# Patient Record
Sex: FEMALE | Race: WHITE | NOT HISPANIC OR LATINO | URBAN - METROPOLITAN AREA
[De-identification: names, ages, dates, MRNs, and addresses within clinical notes are randomized per-mention and may not be internally consistent; named-entity substitution may affect disease eponyms.]

---

## 2021-12-14 ENCOUNTER — PREPPED CHART (OUTPATIENT)
Dept: URBAN - METROPOLITAN AREA CLINIC 19 | Facility: CLINIC | Age: 61
End: 2021-12-14

## 2022-04-13 ASSESSMENT — KERATOMETRY
OS_K2POWER_DIOPTERS: 42.75
OD_K1POWER_DIOPTERS: 42.75
OD_AXISANGLE2_DEGREES: 163
OD_K2POWER_DIOPTERS: 43.50
OS_AXISANGLE2_DEGREES: 44
OD_AXISANGLE_DEGREES: 73
OS_AXISANGLE_DEGREES: 134
OS_K1POWER_DIOPTERS: 41.75

## 2022-04-13 ASSESSMENT — VISUAL ACUITY
OS_CC: 20/25
OD_CC: 20/25
OU_CC: J1+

## 2022-04-13 ASSESSMENT — TONOMETRY
OD_IOP_MMHG: 12
OS_IOP_MMHG: 12

## 2022-05-03 ENCOUNTER — ESTABLISHED PATIENT (OUTPATIENT)
Dept: URBAN - METROPOLITAN AREA CLINIC 19 | Facility: CLINIC | Age: 62
End: 2022-05-03

## 2022-05-03 DIAGNOSIS — H52.4: ICD-10-CM

## 2022-05-03 PROCEDURE — 92015 DETERMINE REFRACTIVE STATE: CPT

## 2022-05-03 ASSESSMENT — VISUAL ACUITY
OS_CC: 20/25
OU_CC: 20/20
OD_CC: 20/40
OD_PH: 20/25

## 2022-05-03 ASSESSMENT — KERATOMETRY
OS_AXISANGLE_DEGREES: 134
OD_K1POWER_DIOPTERS: 42.75
OD_K2POWER_DIOPTERS: 43.50
OS_AXISANGLE2_DEGREES: 44
OD_AXISANGLE2_DEGREES: 163
OS_K1POWER_DIOPTERS: 41.75
OD_AXISANGLE_DEGREES: 73
OS_K2POWER_DIOPTERS: 42.75

## 2022-05-03 ASSESSMENT — TONOMETRY
OS_IOP_MMHG: 12
OD_IOP_MMHG: 11

## 2022-06-29 ENCOUNTER — FOLLOW UP (OUTPATIENT)
Dept: URBAN - METROPOLITAN AREA CLINIC 19 | Facility: CLINIC | Age: 62
End: 2022-06-29

## 2022-06-29 DIAGNOSIS — H40.1231: ICD-10-CM

## 2022-06-29 PROCEDURE — 92083 EXTENDED VISUAL FIELD XM: CPT

## 2022-06-29 PROCEDURE — 99213 OFFICE O/P EST LOW 20 MIN: CPT

## 2022-06-29 PROCEDURE — 92133 CPTRZD OPH DX IMG PST SGM ON: CPT

## 2022-06-29 ASSESSMENT — VISUAL ACUITY
OU_CC: 20/25
OS_CC: 20/25
OD_CC: 20/25

## 2022-06-29 ASSESSMENT — TONOMETRY
OD_IOP_MMHG: 17
OD_IOP_MMHG: 17
OS_IOP_MMHG: 17
OS_IOP_MMHG: 15

## 2022-06-29 NOTE — PATIENT DISCUSSION
iop elvated today due to lack of patient use.  all testing today is stable.  urged patient to do better every night with drops to save vision.

## 2022-08-22 ENCOUNTER — ESTABLISHED PATIENT (OUTPATIENT)
Dept: URBAN - METROPOLITAN AREA CLINIC 19 | Facility: CLINIC | Age: 62
End: 2022-08-22

## 2022-08-22 DIAGNOSIS — H40.1231: ICD-10-CM

## 2022-08-22 DIAGNOSIS — H25.813: ICD-10-CM

## 2022-08-22 DIAGNOSIS — H11.153: ICD-10-CM

## 2022-08-22 DIAGNOSIS — H43.393: ICD-10-CM

## 2022-08-22 DIAGNOSIS — H04.123: ICD-10-CM

## 2022-08-22 DIAGNOSIS — H52.4: ICD-10-CM

## 2022-08-22 PROCEDURE — 92014 COMPRE OPH EXAM EST PT 1/>: CPT

## 2022-08-22 PROCEDURE — 92250 FUNDUS PHOTOGRAPHY W/I&R: CPT

## 2022-08-22 ASSESSMENT — KERATOMETRY
OD_AXISANGLE2_DEGREES: 166
OD_K2POWER_DIOPTERS: 44.00
OS_AXISANGLE2_DEGREES: 38
OS_AXISANGLE_DEGREES: 128
OS_K2POWER_DIOPTERS: 43.00
OD_AXISANGLE_DEGREES: 076
OS_K1POWER_DIOPTERS: 41.75
OD_K1POWER_DIOPTERS: 43.00

## 2022-08-22 ASSESSMENT — VISUAL ACUITY
OD_CC: 20/60+2
OD_CC: 20/80
OS_CC: 20/30
OS_CC: 20/40

## 2022-08-22 ASSESSMENT — TONOMETRY
OS_IOP_MMHG: 16
OD_IOP_MMHG: 19

## 2022-09-09 ENCOUNTER — CONSULTATION/EVALUATION (OUTPATIENT)
Dept: URBAN - METROPOLITAN AREA CLINIC 19 | Facility: CLINIC | Age: 62
End: 2022-09-09

## 2022-09-09 DIAGNOSIS — H25.813: ICD-10-CM

## 2022-09-09 DIAGNOSIS — H35.363: ICD-10-CM

## 2022-09-09 PROCEDURE — 92014 COMPRE OPH EXAM EST PT 1/>: CPT

## 2022-09-09 PROCEDURE — 92134 CPTRZ OPH DX IMG PST SGM RTA: CPT

## 2022-09-09 PROCEDURE — 92136 OPHTHALMIC BIOMETRY: CPT

## 2022-09-09 ASSESSMENT — VISUAL ACUITY
OU_SC: 20/40
OS_SC: 20/40
OD_SC: 20/60

## 2022-09-09 ASSESSMENT — KERATOMETRY
OS_K1POWER_DIOPTERS: 42.00
OD_K1POWER_DIOPTERS: 43.00
OS_AXISANGLE_DEGREES: 128
OS_AXISANGLE_DEGREES: 140
OD_AXISANGLE_DEGREES: 075
OD_AXISANGLE_DEGREES: 072
OD_K1POWER_DIOPTERS: 43.25
OS_AXISANGLE2_DEGREES: 44
OS_AXISANGLE2_DEGREES: 38
OS_AXISANGLE_DEGREES: 134
OD_AXISANGLE2_DEGREES: 162
OD_K2POWER_DIOPTERS: 44.00
OS_K2POWER_DIOPTERS: 43.00
OD_K2POWER_DIOPTERS: 43.50
OD_AXISANGLE2_DEGREES: 165
OD_K2POWER_DIOPTERS: 43.75
OD_AXISANGLE_DEGREES: 076
OS_K1POWER_DIOPTERS: 41.75
OS_AXISANGLE2_DEGREES: 50
OD_AXISANGLE2_DEGREES: 166

## 2022-09-09 ASSESSMENT — TONOMETRY
OD_IOP_MMHG: 13
OS_IOP_MMHG: 10

## 2022-09-09 NOTE — PATIENT DISCUSSION
The patient feels that the OD  cataract is significantly impacting daily activities and has elected cataract surgery. The risks, benefits, and alternatives to surgery were discussed. The patient elects to proceed with surgery.

## 2022-09-09 NOTE — PATIENT DISCUSSION
The patient was informed that with the Basic option, they will most likely need prescription glasses at all focal points after surgery. The patient elects Basic OD.

## 2023-04-07 ENCOUNTER — POST-OP (OUTPATIENT)
Dept: URBAN - METROPOLITAN AREA CLINIC 20 | Facility: CLINIC | Age: 63
End: 2023-04-07

## 2023-04-07 DIAGNOSIS — Z96.1: ICD-10-CM

## 2023-04-07 PROCEDURE — 99024 POSTOP FOLLOW-UP VISIT: CPT

## 2023-04-07 ASSESSMENT — TONOMETRY
OD_IOP_MMHG: 17
OS_IOP_MMHG: 26

## 2023-04-07 ASSESSMENT — VISUAL ACUITY: OS_SC: 20/20

## 2023-04-10 ENCOUNTER — FOLLOW UP (OUTPATIENT)
Dept: URBAN - METROPOLITAN AREA CLINIC 19 | Facility: CLINIC | Age: 63
End: 2023-04-10

## 2023-04-10 DIAGNOSIS — Z96.1: ICD-10-CM

## 2023-04-10 PROCEDURE — 99024 POSTOP FOLLOW-UP VISIT: CPT

## 2023-04-10 ASSESSMENT — VISUAL ACUITY
OS_SC: 20/40+1
OD_PH: 20/40
OD_SC: 20/50

## 2023-04-10 ASSESSMENT — TONOMETRY
OD_IOP_MMHG: 17
OS_IOP_MMHG: 24
OS_IOP_MMHG: 22
OS_IOP_MMHG: 48
OD_IOP_MMHG: 12
OD_IOP_MMHG: 38
OD_IOP_MMHG: 12
OS_IOP_MMHG: 44

## 2023-04-10 ASSESSMENT — KERATOMETRY
OS_K1POWER_DIOPTERS: 43.00
OS_AXISANGLE_DEGREES: 18
OS_K2POWER_DIOPTERS: 42.25
OS_AXISANGLE2_DEGREES: 108

## 2023-04-14 ENCOUNTER — FOLLOW UP (OUTPATIENT)
Dept: URBAN - METROPOLITAN AREA CLINIC 19 | Facility: CLINIC | Age: 63
End: 2023-04-14

## 2023-04-14 DIAGNOSIS — Z96.1: ICD-10-CM

## 2023-04-14 PROCEDURE — 99024 POSTOP FOLLOW-UP VISIT: CPT

## 2023-04-14 ASSESSMENT — VISUAL ACUITY
OD_CC: 20/40-1
OU_CC: 20/30
OS_CC: 20/20
OD_PH: 20/30-2
OU_CC: J1+

## 2023-04-14 ASSESSMENT — TONOMETRY
OS_IOP_MMHG: 21
OD_IOP_MMHG: 16

## 2023-04-17 ENCOUNTER — CONTACT LENSES/GLASSES VISIT (OUTPATIENT)
Dept: URBAN - METROPOLITAN AREA CLINIC 19 | Facility: CLINIC | Age: 63
End: 2023-04-17

## 2023-04-17 DIAGNOSIS — Z96.1: ICD-10-CM

## 2023-04-17 PROCEDURE — 99024 POSTOP FOLLOW-UP VISIT: CPT

## 2023-04-17 ASSESSMENT — KERATOMETRY
OD_AXISANGLE2_DEGREES: 67
OS_K1POWER_DIOPTERS: 43.25
OS_K2POWER_DIOPTERS: 42.25
OS_AXISANGLE2_DEGREES: 103
OD_AXISANGLE_DEGREES: 157
OD_K2POWER_DIOPTERS: 43.00
OS_AXISANGLE_DEGREES: 13
OD_K1POWER_DIOPTERS: 44.00

## 2023-04-17 ASSESSMENT — TONOMETRY
OS_IOP_MMHG: 13
OD_IOP_MMHG: 15

## 2023-04-17 ASSESSMENT — VISUAL ACUITY
OU_CC: 20/20
OS_CC: 20/25
OD_CC: 20/40+1

## 2023-05-30 ENCOUNTER — POST-OP (OUTPATIENT)
Dept: URBAN - METROPOLITAN AREA CLINIC 19 | Facility: CLINIC | Age: 63
End: 2023-05-30

## 2023-05-30 DIAGNOSIS — H26.492: ICD-10-CM

## 2023-05-30 DIAGNOSIS — Z96.1: ICD-10-CM

## 2023-05-30 PROCEDURE — 99024 POSTOP FOLLOW-UP VISIT: CPT

## 2023-05-30 ASSESSMENT — TONOMETRY
OS_IOP_MMHG: 13
OD_IOP_MMHG: 15

## 2023-05-30 ASSESSMENT — KERATOMETRY
OD_K1POWER_DIOPTERS: 43.75
OS_K1POWER_DIOPTERS: 43.25
OD_AXISANGLE_DEGREES: 39
OD_K2POWER_DIOPTERS: 44.50
OS_AXISANGLE2_DEGREES: 115
OS_AXISANGLE_DEGREES: 25
OS_K2POWER_DIOPTERS: 42.25
OD_AXISANGLE2_DEGREES: 129

## 2023-05-30 ASSESSMENT — VISUAL ACUITY
OS_CC: 20/40
OS_PH: 20/25
OS_SC: 20/50-1
OD_CC: 20/20

## 2023-07-21 ENCOUNTER — CONSULTATION/EVALUATION (OUTPATIENT)
Dept: URBAN - METROPOLITAN AREA CLINIC 20 | Facility: CLINIC | Age: 63
End: 2023-07-21

## 2023-07-21 DIAGNOSIS — H26.492: ICD-10-CM

## 2023-07-21 PROCEDURE — 99214 OFFICE O/P EST MOD 30 MIN: CPT

## 2023-07-21 PROCEDURE — 66821 AFTER CATARACT LASER SURGERY: CPT

## 2023-07-21 RX ORDER — PREDNISOLONE ACETATE 10 MG/ML: 1 SUSPENSION/ DROPS OPHTHALMIC

## 2023-07-21 ASSESSMENT — KERATOMETRY
OS_K1POWER_DIOPTERS: 43.25
OD_K2POWER_DIOPTERS: 44.50
OS_AXISANGLE_DEGREES: 25
OD_AXISANGLE2_DEGREES: 129
OD_K1POWER_DIOPTERS: 43.75
OD_AXISANGLE_DEGREES: 39
OS_K2POWER_DIOPTERS: 42.25
OS_AXISANGLE2_DEGREES: 115

## 2023-07-21 ASSESSMENT — VISUAL ACUITY
OS_CC: 20/70
OD_CC: 20/25

## 2023-07-21 ASSESSMENT — TONOMETRY
OS_IOP_MMHG: 15
OD_IOP_MMHG: 16

## 2023-08-08 ENCOUNTER — ESTABLISHED PATIENT (OUTPATIENT)
Dept: URBAN - METROPOLITAN AREA CLINIC 19 | Facility: CLINIC | Age: 63
End: 2023-08-08

## 2023-08-08 DIAGNOSIS — H35.363: ICD-10-CM

## 2023-08-08 DIAGNOSIS — H43.11: ICD-10-CM

## 2023-08-08 DIAGNOSIS — H52.223: ICD-10-CM

## 2023-08-08 DIAGNOSIS — H16.223: ICD-10-CM

## 2023-08-08 DIAGNOSIS — H43.393: ICD-10-CM

## 2023-08-08 DIAGNOSIS — H40.052: ICD-10-CM

## 2023-08-08 PROCEDURE — 92015 DETERMINE REFRACTIVE STATE: CPT

## 2023-08-08 PROCEDURE — 92014 COMPRE OPH EXAM EST PT 1/>: CPT

## 2023-08-08 ASSESSMENT — KERATOMETRY
OS_K1POWER_DIOPTERS: 43
OS_AXISANGLE_DEGREES: 20
OS_K2POWER_DIOPTERS: 42
OD_AXISANGLE_DEGREES: 170
OS_AXISANGLE2_DEGREES: 110
OD_AXISANGLE2_DEGREES: 80
OD_K1POWER_DIOPTERS: 44
OD_K2POWER_DIOPTERS: 42.75

## 2023-08-08 ASSESSMENT — VISUAL ACUITY
OS_CC: 20/25+1
OU_CC: 20/20-1
OD_CC: 20/20

## 2023-08-08 ASSESSMENT — TONOMETRY
OD_IOP_MMHG: 15
OS_IOP_MMHG: 13

## 2023-12-13 ENCOUNTER — ESTABLISHED PATIENT (OUTPATIENT)
Dept: URBAN - METROPOLITAN AREA CLINIC 19 | Facility: CLINIC | Age: 63
End: 2023-12-13

## 2023-12-13 DIAGNOSIS — H35.363: ICD-10-CM

## 2023-12-13 DIAGNOSIS — H40.1231: ICD-10-CM

## 2023-12-13 DIAGNOSIS — H40.052: ICD-10-CM

## 2023-12-13 PROCEDURE — 99213 OFFICE O/P EST LOW 20 MIN: CPT

## 2023-12-13 PROCEDURE — 92133 CPTRZD OPH DX IMG PST SGM ON: CPT

## 2023-12-13 PROCEDURE — 92083 EXTENDED VISUAL FIELD XM: CPT

## 2023-12-13 ASSESSMENT — VISUAL ACUITY
OS_CC: 20/20
OU_CC: 20/20
OD_CC: 20/20

## 2023-12-13 ASSESSMENT — TONOMETRY
OS_IOP_MMHG: 14
OD_IOP_MMHG: 16

## 2024-06-13 ENCOUNTER — ESTABLISHED PATIENT (OUTPATIENT)
Dept: URBAN - METROPOLITAN AREA CLINIC 19 | Facility: CLINIC | Age: 64
End: 2024-06-13

## 2024-06-13 DIAGNOSIS — H35.363: ICD-10-CM

## 2024-06-13 DIAGNOSIS — H43.393: ICD-10-CM

## 2024-06-13 DIAGNOSIS — H52.223: ICD-10-CM

## 2024-06-13 DIAGNOSIS — H40.1231: ICD-10-CM

## 2024-06-13 DIAGNOSIS — H16.223: ICD-10-CM

## 2024-06-13 DIAGNOSIS — H40.052: ICD-10-CM

## 2024-06-13 PROCEDURE — 92015 DETERMINE REFRACTIVE STATE: CPT

## 2024-06-13 PROCEDURE — 92014 COMPRE OPH EXAM EST PT 1/>: CPT

## 2024-06-13 PROCEDURE — 92134 CPTRZ OPH DX IMG PST SGM RTA: CPT

## 2024-06-13 ASSESSMENT — TONOMETRY
OS_IOP_MMHG: 15
OD_IOP_MMHG: 19

## 2024-06-13 ASSESSMENT — VISUAL ACUITY
OS_CC: 20/20
OU_CC: 20/20
OD_CC: 20/20

## 2024-06-13 ASSESSMENT — KERATOMETRY
OD_K1POWER_DIOPTERS: 43.75
OD_AXISANGLE2_DEGREES: 80
OS_K1POWER_DIOPTERS: 43
OS_AXISANGLE_DEGREES: 28
OD_K2POWER_DIOPTERS: 42.75
OS_K2POWER_DIOPTERS: 42.25
OS_AXISANGLE2_DEGREES: 118
OD_AXISANGLE_DEGREES: 170

## 2024-12-18 ENCOUNTER — FOLLOW UP (OUTPATIENT)
Age: 64
End: 2024-12-18

## 2024-12-18 DIAGNOSIS — H40.051: ICD-10-CM

## 2024-12-18 DIAGNOSIS — H40.1221: ICD-10-CM

## 2024-12-18 PROCEDURE — 92083 EXTENDED VISUAL FIELD XM: CPT

## 2024-12-18 PROCEDURE — 92133 CPTRZD OPH DX IMG PST SGM ON: CPT

## 2024-12-18 PROCEDURE — 99212 OFFICE O/P EST SF 10 MIN: CPT
